# Patient Record
Sex: FEMALE | Race: WHITE | NOT HISPANIC OR LATINO | URBAN - METROPOLITAN AREA
[De-identification: names, ages, dates, MRNs, and addresses within clinical notes are randomized per-mention and may not be internally consistent; named-entity substitution may affect disease eponyms.]

---

## 2017-01-11 ENCOUNTER — OFFICE VISIT - RIVER FALLS (OUTPATIENT)
Dept: FAMILY MEDICINE | Facility: CLINIC | Age: 22
End: 2017-01-11

## 2017-01-11 ASSESSMENT — MIFFLIN-ST. JEOR: SCORE: 1727

## 2017-02-14 ENCOUNTER — OFFICE VISIT - RIVER FALLS (OUTPATIENT)
Dept: FAMILY MEDICINE | Facility: CLINIC | Age: 22
End: 2017-02-14

## 2017-02-14 ASSESSMENT — MIFFLIN-ST. JEOR: SCORE: 1716.11

## 2017-03-29 ENCOUNTER — OFFICE VISIT - RIVER FALLS (OUTPATIENT)
Dept: FAMILY MEDICINE | Facility: CLINIC | Age: 22
End: 2017-03-29

## 2017-03-29 ASSESSMENT — MIFFLIN-ST. JEOR: SCORE: 1711.58

## 2022-02-11 VITALS
HEIGHT: 67 IN | SYSTOLIC BLOOD PRESSURE: 122 MMHG | HEART RATE: 81 BPM | TEMPERATURE: 98.1 F | BODY MASS INDEX: 32.49 KG/M2 | DIASTOLIC BLOOD PRESSURE: 78 MMHG | WEIGHT: 207 LBS

## 2022-02-12 VITALS
HEIGHT: 67 IN | WEIGHT: 210.4 LBS | HEART RATE: 70 BPM | HEIGHT: 67 IN | SYSTOLIC BLOOD PRESSURE: 117 MMHG | HEART RATE: 60 BPM | OXYGEN SATURATION: 97 % | SYSTOLIC BLOOD PRESSURE: 126 MMHG | DIASTOLIC BLOOD PRESSURE: 82 MMHG | TEMPERATURE: 98.3 F | BODY MASS INDEX: 33.02 KG/M2 | TEMPERATURE: 97.9 F | DIASTOLIC BLOOD PRESSURE: 77 MMHG | BODY MASS INDEX: 32.65 KG/M2 | WEIGHT: 208 LBS

## 2022-02-15 NOTE — PROGRESS NOTES
Patient:   REY VASQUEZ            MRN: [id number]            FIN: [id number]               Age:   21 years     Sex:  Female     :  1995   Associated Diagnoses:   Well adult; Unspecified contraceptive management   Author:   Harsha Burgess MD      Visit Information      Date of Service: 2017 12:54 pm  Performing Location: [facility]  Encounter#: [id number]      Primary Care Provider (PCP):  Not recorded.   Visit type:  Annual exam.         Medication: Follow-up, Refill.    Source of history:  Self, Medical record.    History limitation:  None.       Chief Complaint   2017 1:00 PM CST    Pt here for annual physical. Currently has menses. Needing meds refilled.        Well Adult History   Well Adult History             The patient presents for well adult exam.  The patient's general health status is described as good.  The patient's diet is described as balanced.  Exercise: routine, 6  times per week.  Associated symptoms consist of none.  Last menstrual period: 2016 , regular.  Medical encounters: none.  Compliance problems: none.  Additional pertinent history: daily caffeine use, tobacco use none and no alcohol use.     Other concerns:   She requests refill of OCP. No concerns with the medication.   sexually active with one partner      Review of Systems   Constitutional:  No fever, No chills, No sweats, No weakness, No fatigue.    Eye:  No recent visual problem.    Ear/Nose/Mouth/Throat:  No decreased hearing, No nasal congestion, No sore throat.    Respiratory:  No shortness of breath, No cough.    Cardiovascular:  Negative, No chest pain, No palpitations, No peripheral edema.    Gastrointestinal:  No nausea, No vomiting, No diarrhea, No constipation, No heartburn.    Genitourinary:  No dysuria, No change in urine stream.    Gynecologic:  Negative.    Hematology/Lymphatics:  No bruising tendency, No bleeding tendency.    Endocrine:  No cold intolerance, No heat  intolerance.    Immunologic:  Negative.    Musculoskeletal:  No back pain, No neck pain, No joint pain, No muscle pain.    Integumentary:  No rash, No dryness, No skin lesion.    Neurologic:  Alert and oriented X4, No headache.    Psychiatric:  No anxiety, No depression.             Health Status   Allergies:    Allergic Reactions (Selected)  Severity Not Documented  Chemicals in hair dye (No reactions were documented)   Medications:  (Selected)   Prescriptions  Prescribed  TriNessa oral tablet: 1 tab(s), po, daily, # 84 tab(s), 0 Refill(s), Type: Maintenance, Pharmacy: [facility], 1 tab(s) po daily  albuterol 90 mcg/inh inhalation powder: See Instructions, Instructions: 2 puff(s) inh q4 hrs  as directed 5-15 minutes before exercise, # 1 EA, 0 Refill(s), Type: Maintenance, Pharmacy: [facility], 2 puff(s) inh q4 hrs; as directed 5-15 minutes before exercise   Problem list:    All Problems  Obesity / SNOMED CT 3913827458 / Probable  Seasonal allergies / SNOMED CT 926784912 / Confirmed  Menarche / SNOMED CT 00531671 / Confirmed      Histories   Past Medical History:    Active  Menarche (68532278): Onset in 2009 at 14 years.  Seasonal allergies (967148262)   Family History:       Procedure history:    ACL injury tear (SNOMED CT [id number]).   Social History:        Alcohol Assessment            Never      Tobacco Assessment            Never      Substance Abuse Assessment            Never      Employment and Education Assessment            Student      Exercise and Physical Activity Assessment: Regular exercise            Exercise frequency: Daily.  Exercise type: Weight lifting, Basketball.      Sexual Assessment            Sexually active: Yes.  Sexual orientation: Heterosexual.  Contraceptive Use Details: Condoms.        Physical Examination   Vital Signs   1/11/2017 1:00 PM CST Temperature Tympanic 97.9 DegF    Peripheral Pulse Rate 60 bpm     Systolic Blood Pressure 126 mmHg    Diastolic Blood Pressure 82 mmHg    Mean Arterial Pressure 97 mmHg    BP Site Right arm    BP Method Electronic      Measurements from flowsheet : Measurements   1/11/2017 1:00 PM CST Height Measured - Standard 67 in    Weight Measured - Standard 210.4 lb    BSA 2.12 m2    Body Mass Index 32.95 kg/m2      General:  Alert and oriented, No acute distress.    Neck:  Supple, Non-tender, No thyromegaly.    Respiratory:  Lungs are clear to auscultation, Respirations are non-labored.    Cardiovascular:  Normal rate, Regular rhythm, No edema.    Gastrointestinal:  Soft, Non-tender.    Integumentary:  No rash.    Neurologic:  Alert, Oriented.       Impression and Plan   Diagnosis     Well adult (FQT77-CF Z00.00).     Unspecified contraceptive management (IDS96-GN Z30.41).     Course:  Progressing as expected.    Plan:  Counseled on health maintenance, diet, activity, BMI discussed, continue current medication  set u[p pap in next 3 months (menses today).    Orders     Orders (Selected)   Outpatient Orders  Ordered  Return to Clinic (Request): RFV: annual px, Return in 1 year  Prescriptions  Prescribed  TriNessa oral tablet: 1 tab(s), po, daily, # 84 tab(s), 0 Refill(s), Type: Maintenance, Pharmacy: [facility], 1 tab(s) po daily  albuterol 90 mcg/inh inhalation powder: See Instructions, Instructions: 2 puff(s) inh q4 hrs  as directed 5-15 minutes before exercise, # 1 EA, 0 Refill(s), Type: Maintenance, Pharmacy: [facility], 2 puff(s) inh q4 hrs; as directed 5-15 minutes before exercise.

## 2022-02-15 NOTE — PROGRESS NOTES
Patient:   REY VASQUEZ            MRN: [id number]            FIN: [id number]               Age:   21 years     Sex:  Female     :  1995   Associated Diagnoses:   None   Author:   Joel Roe MD      Visit Information      Date of Service: 2017 08:47 am  Performing Location: [facility]  Encounter#: [id number]      Primary Care Provider (PCP):  Not recorded.      Referring Provider:  No referring provider recorded for selected visit.      Chief Complaint   3/29/2017 8:53 AM CDT    Pt here for pap and med refilled.        History of Present Illness   Pt presents for a pap semar. She is sexually active w one partner. She has no vaginal symptoms, or irregular bleeding. Happy with current birth control      Review of Systems            Health Status   Allergies:    Allergic Reactions (Selected)  Severity Not Documented  Chemicals in hair dye (No reactions were documented)   Medications:  (Selected)   Prescriptions  Prescribed  TriNessa oral tablet: 1 tab(s), po, daily, # 84 tab(s), 3 Refill(s), Type: Maintenance, Pharmacy: [facility], 1 tab(s) po daily  mometasone 50 mcg/inh nasal spray: 2 spray(s), nasal, bid, # 1 EA, 0 Refill(s), Type: Maintenance, Pharmacy: [facility], 2 spray(s) nasal bid,x7 day(s)   Problem list:    All Problems  Menarche / SNOMED CT 21488153 / Confirmed  Obesity / SNOMED CT 8747351824 / Probable  Seasonal allergies / SNOMED CT 282688182 / Confirmed      Histories   Past Medical History:    Active  Menarche (37376373): Onset in  at 14 years.  Seasonal allergies ([id number])   Family History:       Procedure history:    ACL injury tear ([id number]) on 2013 at 17 Years.   Social History:        Alcohol Assessment: Current                     Comments:                      2017 - Meenu Thompson                     Once per wk, 1 drink.      Tobacco Assessment             Never      Substance Abuse Assessment            Never      Employment and Education Assessment            Student      Exercise and Physical Activity Assessment: Regular exercise            Exercise frequency: 5-6 times/week.  Exercise type: Basketball.      Sexual Assessment            Sexually active: Yes.  Sexual orientation: Heterosexual.  Contraceptive Use Details: Condoms.        Physical Examination   Vital Signs   3/29/2017 8:53 AM CDT Temperature Tympanic 98.1 DegF    Peripheral Pulse Rate 81 bpm    Systolic Blood Pressure 122 mmHg    Diastolic Blood Pressure 78 mmHg    Mean Arterial Pressure 93 mmHg      Measurements from flowsheet : Measurements   3/29/2017 8:53 AM CDT Height Measured - Standard 67 in    Weight Measured - Standard 207 lb    BSA 2.1 m2    Body Mass Index 32.42 kg/m2      Normal external genitalia, cervix appears normal      Impression and Plan   Cervical cancer screening  - pap smear obtained